# Patient Record
Sex: FEMALE | Race: WHITE | Employment: FULL TIME | URBAN - METROPOLITAN AREA
[De-identification: names, ages, dates, MRNs, and addresses within clinical notes are randomized per-mention and may not be internally consistent; named-entity substitution may affect disease eponyms.]

---

## 2019-09-18 ENCOUNTER — NURSE TRIAGE (OUTPATIENT)
Dept: NURSING | Facility: CLINIC | Age: 24
End: 2019-09-18

## 2019-09-18 ENCOUNTER — HOSPITAL ENCOUNTER (EMERGENCY)
Facility: CLINIC | Age: 24
Discharge: HOME | End: 2019-09-19
Attending: EMERGENCY MEDICINE | Admitting: EMERGENCY MEDICINE
Payer: COMMERCIAL

## 2019-09-18 VITALS
RESPIRATION RATE: 18 BRPM | HEART RATE: 82 BPM | WEIGHT: 120 LBS | SYSTOLIC BLOOD PRESSURE: 126 MMHG | DIASTOLIC BLOOD PRESSURE: 80 MMHG | BODY MASS INDEX: 21.26 KG/M2 | HEIGHT: 63 IN | TEMPERATURE: 98 F | OXYGEN SATURATION: 100 %

## 2019-09-18 DIAGNOSIS — H72.91 PERFORATION OF TYMPANIC MEMBRANE, RIGHT: ICD-10-CM

## 2019-09-18 DIAGNOSIS — H60.391 INFECTIVE OTITIS EXTERNA, RIGHT: Primary | ICD-10-CM

## 2019-09-18 PROCEDURE — 99282 EMERGENCY DEPT VISIT SF MDM: CPT

## 2019-09-18 RX ORDER — CIPROFLOXACIN AND DEXAMETHASONE 3; 1 MG/ML; MG/ML
4 SUSPENSION/ DROPS AURICULAR (OTIC) 2 TIMES DAILY
Qty: 2.8 ML | Refills: 0 | Status: SHIPPED | OUTPATIENT
Start: 2019-09-18 | End: 2019-09-25

## 2019-09-18 RX ORDER — VALPROIC ACID 250 MG/1
250 CAPSULE, LIQUID FILLED ORAL 4 TIMES DAILY
COMMUNITY

## 2019-09-18 RX ORDER — ZONISAMIDE 100 MG/1
100 CAPSULE ORAL DAILY
COMMUNITY

## 2019-09-18 RX ORDER — TRAZODONE HYDROCHLORIDE 50 MG/1
50 TABLET, FILM COATED ORAL AT BEDTIME
COMMUNITY

## 2019-09-18 RX ORDER — CITALOPRAM HYDROBROMIDE 40 MG/1
40 TABLET ORAL DAILY
COMMUNITY

## 2019-09-18 ASSESSMENT — MIFFLIN-ST. JEOR: SCORE: 1273.45

## 2019-09-18 NOTE — ED AVS SNAPSHOT
[facility] Emergency Department  [address]  [facility] [address]  Phone:  [phone]  Fax:  [phone]                                    Chelsea Zepeda   MRN: [id number]    Department:  [facility] Emergency Department   Date of Visit:  9/18/2019           After Visit Summary Signature Page    I have received my discharge instructions, and my questions have been answered. I have discussed any challenges I see with this plan with the nurse or doctor.    ..........................................................................................................................................  Patient/Patient Representative Signature      ..........................................................................................................................................  Patient Representative Print Name and Relationship to Patient    ..................................................               ................................................  Date                                   Time    ..........................................................................................................................................  Reviewed by Signature/Title    ...................................................              ..............................................  Date                                               Time          22EPIC Rev 08/18

## 2019-09-19 RX ORDER — OFLOXACIN 3 MG/ML
5 SOLUTION AURICULAR (OTIC) DAILY
Qty: 5 ML | Refills: 0 | Status: SHIPPED | OUTPATIENT
Start: 2019-09-19 | End: 2019-09-26

## 2019-09-19 NOTE — ED PROVIDER NOTES
"  History     Chief Complaint:    Ear Injury     HPI   Chelsea Zepeda is a 24 year old female who presents with ear injury. The patient reports that tonight she was trying to clean her right ear with a Q Tip when it started to bleed therefore prompting her presentation to the emergency department. The patient states that hearing is intact.     Allergies:  No known drug allergies.       Medications:    Celexa  Desyrel  Depakene   Zonegran     Past Medical History:    Medical history reviewed. No pertinent medical history.      Past Surgical History:    Past surgical history reviewed. No pertinent past surgical history.     Family History:    Family history reviewed. No pertinent family history.     Social History:  The patient was accompanied to the ED by friend.  Marital Status: Single      Review of Systems   HENT: Negative for hearing loss.         Ear canal bleeding, right    All other systems reviewed and are negative.    Physical Exam     Patient Vitals for the past 24 hrs:   BP Temp Temp src Pulse Resp SpO2 Height Weight   09/18/19 2311 126/80 98  F (36.7  C) Temporal 82 18 100 % 1.6 m (5' 3\") 54.4 kg (120 lb)      Physical Exam    Constitutional: Patient is well appearing. No distress.  Head: Atraumatic.  Mouth/Throat: Oropharynx is clear and moist. No oropharyngeal exudate.  Eyes: Conjunctivae and EOM are normal. No scleral icterus.  Ears: Right TM appears to be anterior, slightly inferior in the canal an area of healed oozing blood that could represent a scab that was removed or an injury to the canal. The TM looks intact but because of the bleeding there is possibility for small perforation.  Ability to hear is completely intact.  Neck: Normal range of motion. Neck supple.   Cardiovascular: Normal rate, regular rhythm, normal heart sounds and intact distal pulses.   Pulmonary/Chest: Breath sounds normal. No respiratory distress.  Abdominal: Soft. Bowel sounds are normal. No distension. No tenderness. No " rebound or guarding.   Musculoskeletal: Normal range of motion. No edema or tenderness.   Neurological: Alert and orientated to person, place, and time. No observable focal neuro deficit  Skin: Warm and dry. No rash noted. Not diaphoretic.     Emergency Department Course     Emergency Department Course:    2328 Nursing notes and vitals reviewed. I performed an exam of the patient as documented above.     2332 Prior to discharge, I personally reviewed the results with the patietn and all related questions were answered. The patient verbalized understanding and is amenable to plan.     Impression & Plan      Medical Decision Making:  Chelsea Zepeda is a 24 year old female who presents for evaluation of ear bleeding.  Exam shows a probably a ear canal injury remote possibly  perforated tympanic membrane as well, most likely related to Q Tip use.  There is no evidence of other trauma and advanced imaging is not indicated.  Antibiotic drops prescribed.  Water precautions given.  Will have her follow up with primary and ENT.      Diagnosis:    ICD-10-CM    1. Perforation of tympanic membrane, right H72.91      Disposition:   The patient is discharged to home.     Discharge Medications:  New Prescriptions    CIPROFLOXACIN-DEXAMETHASONE (CIPRODEX) 0.3-0.1 % OTIC SUSPENSION    Place 4 drops into the right ear 2 times daily for 7 days     Scribe Disclosure:  I, Orla Severson, am serving as a scribe at 11:29 PM on 9/18/2019 to document services personally performed by Jovany Mcintyre MD based on my observations and the provider's statements to me.    [facility] EMERGENCY DEPARTMENT       Jovany Mcintyre MD  09/19/19 0047

## 2019-09-19 NOTE — TELEPHONE ENCOUNTER
"\"I felt as if there was a pressure changed in my ear, like in an airplane, and I noticed my right ear was bleeding\".    Reason for Disposition    [1] Unexplained bleeding AND [2] lasts > 10 minutes or large amount (Exception: if a few drops of blood, continue with triage)    Additional Information    Negative: Knocked out (unconscious) > 1 minute    Negative: Difficult to awaken or acting confused (e.g., disoriented, slurred speech)    Negative: Severe neck pain    Negative: Sounds like a life-threatening emergency to the triager    Negative: [1] Bloody discharge AND [2] it followed ear trauma    Negative: [1] Followed head or face injury AND [2] clear or bloody fluid    Protocols used: EAR - [id number], EAR INJURY-A-    Tamiko Alvarado RN  [facility]      "

## 2019-12-12 ENCOUNTER — OFFICE VISIT (OUTPATIENT)
Dept: URGENT CARE | Facility: URGENT CARE | Age: 24
End: 2019-12-12
Payer: COMMERCIAL

## 2019-12-12 VITALS
RESPIRATION RATE: 16 BRPM | OXYGEN SATURATION: 98 % | TEMPERATURE: 99 F | BODY MASS INDEX: 21.42 KG/M2 | HEART RATE: 99 BPM | SYSTOLIC BLOOD PRESSURE: 114 MMHG | DIASTOLIC BLOOD PRESSURE: 76 MMHG | WEIGHT: 120.9 LBS

## 2019-12-12 DIAGNOSIS — J06.9 VIRAL UPPER RESPIRATORY TRACT INFECTION: Primary | ICD-10-CM

## 2019-12-12 PROCEDURE — 99204 OFFICE O/P NEW MOD 45 MIN: CPT | Performed by: PHYSICIAN ASSISTANT

## 2019-12-12 RX ORDER — BENZONATATE 200 MG/1
200 CAPSULE ORAL 3 TIMES DAILY PRN
Qty: 30 CAPSULE | Refills: 0 | Status: SHIPPED | OUTPATIENT
Start: 2019-12-12

## 2019-12-12 RX ORDER — ALBUTEROL SULFATE 90 UG/1
2 AEROSOL, METERED RESPIRATORY (INHALATION) EVERY 6 HOURS PRN
Qty: 1 INHALER | Refills: 0 | Status: SHIPPED | OUTPATIENT
Start: 2019-12-12

## 2019-12-12 ASSESSMENT — ENCOUNTER SYMPTOMS
DIARRHEA: 0
SORE THROAT: 0
NAUSEA: 0
CHEST TIGHTNESS: 1
SINUS PRESSURE: 1
COUGH: 1
CHILLS: 0
FEVER: 0
VOMITING: 0
SINUS PAIN: 1

## 2019-12-12 NOTE — LETTER
December 12, 2019      Chelsea Zepeda  [address] [place], [place] [id number]        To Whom It May Concern:    Chelsea Zepeda  was seen on 12/12/2019  Please excuse her absence from work today due to acute medical illness.        Sincerely,        Fani Chacon PA-C

## 2019-12-12 NOTE — PROGRESS NOTES
SUBJECTIVE:   Chelsea Zepeda is a 24 year old female presenting with a chief complaint of   Chief Complaint   Patient presents with     Urgent Care     URI     Started Monday, sinus, cough, congestion, feel SOB at time, cough is painful, sinus pressure      Asthma       She is a new patient of [facility].    URI Adult    Onset of symptoms was 4 day(s) ago.  Course of illness is same.    Severity moderate  Current and Associated symptoms: Cough, chest tightness ,hoarse voice, sinus pain/pressure.  Denies any fevers or chills.  Treatment measures tried include Tylenol  Predisposing factors include Hx of exercised induced asthma        Review of Systems   Constitutional: Negative for chills and fever.   HENT: Positive for congestion, sinus pressure and sinus pain. Negative for sore throat.    Respiratory: Positive for cough and chest tightness.    Cardiovascular: Negative for chest pain.   Gastrointestinal: Negative for diarrhea, nausea and vomiting.   Musculoskeletal: Negative for neck pain.   Skin: Negative for rash.   Allergic/Immunologic: Negative for immunocompromised state.   Neurological: Negative for headaches.   Hematological: Does not bruise/bleed easily.   Psychiatric/Behavioral: Negative for confusion.       History reviewed. No pertinent past medical history.  History reviewed. No pertinent family history.  Current Outpatient Medications   Medication Sig Dispense Refill     albuterol (PROAIR HFA/PROVENTIL HFA/VENTOLIN HFA) 108 (90 Base) MCG/ACT inhaler Inhale 2 puffs into the lungs every 6 hours as needed for other (chest tightness) 1 Inhaler 0     benzonatate (TESSALON) 200 MG capsule Take 1 capsule (200 mg) by mouth 3 times daily as needed for cough 30 capsule 0     citalopram (CELEXA) 40 MG tablet Take 40 mg by mouth daily       traZODone (DESYREL) 50 MG tablet Take 50 mg by mouth At Bedtime       valproic acid (DEPAKENE) 250 MG capsule Take 250 mg by mouth 4 times daily       zonisamide (ZONEGRAN) 100  MG capsule Take 100 mg by mouth daily       Social History     Tobacco Use     Smoking status: Current Every Day Smoker     Types: Vaping Device     Smokeless tobacco: Never Used   Substance Use Topics     Alcohol use: Not on file       OBJECTIVE  /76   Pulse 99   Temp 99  F (37.2  C) (Oral)   Resp 16   Wt 54.8 kg (120 lb 14.4 oz)   SpO2 98%   BMI 21.42 kg/m      Physical Exam  Constitutional:       General: She is not in acute distress.     Appearance: She is well-developed.   HENT:      Head: Normocephalic and atraumatic.      Right Ear: Tympanic membrane and external ear normal.      Left Ear: Tympanic membrane and external ear normal.      Nose: Congestion present.      Mouth/Throat:      Mouth: Mucous membranes are moist.      Pharynx: Oropharynx is clear.   Eyes:      Conjunctiva/sclera: Conjunctivae normal.   Neck:      Musculoskeletal: Normal range of motion.   Cardiovascular:      Rate and Rhythm: Regular rhythm.      Heart sounds: Normal heart sounds.   Pulmonary:      Effort: Pulmonary effort is normal. No respiratory distress.      Breath sounds: Normal breath sounds. No wheezing, rhonchi or rales.   Skin:     General: Skin is warm and dry.   Neurological:      Mental Status: She is alert.         Labs:  No results found for this or any previous visit (from the past 24 hour(s)).        ASSESSMENT:      ICD-10-CM    1. Viral upper respiratory tract infection J06.9 albuterol (PROAIR HFA/PROVENTIL HFA/VENTOLIN HFA) 108 (90 Base) MCG/ACT inhaler     benzonatate (TESSALON) 200 MG capsule          PLAN:    URI: Lungs are clear on exam.  Tessalon Perles prescribed as needed for cough.  Albuterol inhaler prescribed as needed for chest tightness.  Supportive care measures advised.  Push fluids.  Rest.  Follow-up if any worsening symptoms. Patient understands and agrees with the plan.        Followup:    If not improving or if condition worsens, follow up with your Primary Care Provider

## 2019-12-13 ASSESSMENT — ENCOUNTER SYMPTOMS
CONFUSION: 0
HEADACHES: 0
BRUISES/BLEEDS EASILY: 0
NECK PAIN: 0